# Patient Record
Sex: FEMALE | Race: WHITE | NOT HISPANIC OR LATINO | URBAN - METROPOLITAN AREA
[De-identification: names, ages, dates, MRNs, and addresses within clinical notes are randomized per-mention and may not be internally consistent; named-entity substitution may affect disease eponyms.]

---

## 2017-06-15 ENCOUNTER — COMMUNICATION - HEALTHEAST (OUTPATIENT)
Dept: FAMILY MEDICINE | Facility: CLINIC | Age: 52
End: 2017-06-15

## 2017-06-15 DIAGNOSIS — Z91.09 OTHER ALLERGY, OTHER THAN TO MEDICINAL AGENTS: ICD-10-CM

## 2017-08-03 ENCOUNTER — COMMUNICATION - HEALTHEAST (OUTPATIENT)
Dept: FAMILY MEDICINE | Facility: CLINIC | Age: 52
End: 2017-08-03

## 2017-08-03 DIAGNOSIS — Z91.09 OTHER ALLERGY, OTHER THAN TO MEDICINAL AGENTS: ICD-10-CM

## 2017-08-16 ENCOUNTER — COMMUNICATION - HEALTHEAST (OUTPATIENT)
Dept: FAMILY MEDICINE | Facility: CLINIC | Age: 52
End: 2017-08-16

## 2017-08-16 DIAGNOSIS — Z91.09 OTHER ALLERGY, OTHER THAN TO MEDICINAL AGENTS: ICD-10-CM

## 2017-09-23 ENCOUNTER — COMMUNICATION - HEALTHEAST (OUTPATIENT)
Dept: FAMILY MEDICINE | Facility: CLINIC | Age: 52
End: 2017-09-23

## 2017-09-23 DIAGNOSIS — Z91.09 OTHER ALLERGY, OTHER THAN TO MEDICINAL AGENTS: ICD-10-CM

## 2017-10-19 ENCOUNTER — COMMUNICATION - HEALTHEAST (OUTPATIENT)
Dept: FAMILY MEDICINE | Facility: CLINIC | Age: 52
End: 2017-10-19

## 2017-10-19 DIAGNOSIS — F41.1 GENERALIZED ANXIETY DISORDER: ICD-10-CM

## 2017-11-04 ENCOUNTER — COMMUNICATION - HEALTHEAST (OUTPATIENT)
Dept: FAMILY MEDICINE | Facility: CLINIC | Age: 52
End: 2017-11-04

## 2017-11-24 ENCOUNTER — OFFICE VISIT - HEALTHEAST (OUTPATIENT)
Dept: FAMILY MEDICINE | Facility: CLINIC | Age: 52
End: 2017-11-24

## 2017-11-24 DIAGNOSIS — J01.00 ACUTE MAXILLARY SINUSITIS, RECURRENCE NOT SPECIFIED: ICD-10-CM

## 2017-11-24 DIAGNOSIS — Z12.11 SCREEN FOR COLON CANCER: ICD-10-CM

## 2017-11-24 DIAGNOSIS — Z12.31 VISIT FOR SCREENING MAMMOGRAM: ICD-10-CM

## 2017-11-24 DIAGNOSIS — Z23 NEED FOR VACCINATION: ICD-10-CM

## 2017-11-24 DIAGNOSIS — Z00.00 HEALTH MAINTENANCE EXAMINATION: ICD-10-CM

## 2017-11-24 ASSESSMENT — MIFFLIN-ST. JEOR: SCORE: 1316.42

## 2017-11-28 ENCOUNTER — COMMUNICATION - HEALTHEAST (OUTPATIENT)
Dept: FAMILY MEDICINE | Facility: CLINIC | Age: 52
End: 2017-11-28

## 2017-12-11 ENCOUNTER — COMMUNICATION - HEALTHEAST (OUTPATIENT)
Dept: SCHEDULING | Facility: CLINIC | Age: 52
End: 2017-12-11

## 2017-12-19 ENCOUNTER — COMMUNICATION - HEALTHEAST (OUTPATIENT)
Dept: FAMILY MEDICINE | Facility: CLINIC | Age: 52
End: 2017-12-19

## 2017-12-19 ENCOUNTER — OFFICE VISIT - HEALTHEAST (OUTPATIENT)
Dept: FAMILY MEDICINE | Facility: CLINIC | Age: 52
End: 2017-12-19

## 2017-12-19 DIAGNOSIS — Z00.00 HEALTH MAINTENANCE EXAMINATION: ICD-10-CM

## 2017-12-19 DIAGNOSIS — R05.9 COUGH: ICD-10-CM

## 2017-12-19 LAB
CHOLEST SERPL-MCNC: 249 MG/DL
FASTING STATUS PATIENT QL REPORTED: YES
HDLC SERPL-MCNC: 46 MG/DL
LDLC SERPL CALC-MCNC: 175 MG/DL
TRIGL SERPL-MCNC: 140 MG/DL

## 2018-01-20 ENCOUNTER — COMMUNICATION - HEALTHEAST (OUTPATIENT)
Dept: FAMILY MEDICINE | Facility: CLINIC | Age: 53
End: 2018-01-20

## 2018-01-20 DIAGNOSIS — F41.1 GENERALIZED ANXIETY DISORDER: ICD-10-CM

## 2018-10-02 ENCOUNTER — COMMUNICATION - HEALTHEAST (OUTPATIENT)
Dept: FAMILY MEDICINE | Facility: CLINIC | Age: 53
End: 2018-10-02

## 2018-10-02 DIAGNOSIS — J01.00 ACUTE MAXILLARY SINUSITIS, RECURRENCE NOT SPECIFIED: ICD-10-CM

## 2018-10-03 ENCOUNTER — COMMUNICATION - HEALTHEAST (OUTPATIENT)
Dept: FAMILY MEDICINE | Facility: CLINIC | Age: 53
End: 2018-10-03

## 2018-10-05 ENCOUNTER — OFFICE VISIT - HEALTHEAST (OUTPATIENT)
Dept: FAMILY MEDICINE | Facility: CLINIC | Age: 53
End: 2018-10-05

## 2018-10-05 DIAGNOSIS — J20.9 ACUTE BRONCHITIS WITH SYMPTOMS > 10 DAYS: ICD-10-CM

## 2018-10-09 ENCOUNTER — OFFICE VISIT - HEALTHEAST (OUTPATIENT)
Dept: FAMILY MEDICINE | Facility: CLINIC | Age: 53
End: 2018-10-09

## 2018-10-09 ENCOUNTER — RECORDS - HEALTHEAST (OUTPATIENT)
Dept: ADMINISTRATIVE | Facility: OTHER | Age: 53
End: 2018-10-09

## 2018-10-09 DIAGNOSIS — Z00.00 HEALTH MAINTENANCE EXAMINATION: ICD-10-CM

## 2018-10-09 DIAGNOSIS — E78.2 MIXED HYPERLIPIDEMIA: ICD-10-CM

## 2018-10-09 DIAGNOSIS — J45.20 MILD INTERMITTENT ASTHMA, UNSPECIFIED WHETHER COMPLICATED: ICD-10-CM

## 2018-10-09 DIAGNOSIS — F41.1 GENERALIZED ANXIETY DISORDER: ICD-10-CM

## 2018-10-09 LAB
CHOLEST SERPL-MCNC: 248 MG/DL
FASTING STATUS PATIENT QL REPORTED: YES
HDLC SERPL-MCNC: 47 MG/DL
LDLC SERPL CALC-MCNC: 178 MG/DL
TRIGL SERPL-MCNC: 115 MG/DL

## 2018-10-09 ASSESSMENT — MIFFLIN-ST. JEOR: SCORE: 1307.92

## 2018-11-01 ENCOUNTER — RECORDS - HEALTHEAST (OUTPATIENT)
Dept: ADMINISTRATIVE | Facility: OTHER | Age: 53
End: 2018-11-01

## 2018-11-18 ENCOUNTER — COMMUNICATION - HEALTHEAST (OUTPATIENT)
Dept: FAMILY MEDICINE | Facility: CLINIC | Age: 53
End: 2018-11-18

## 2018-11-18 DIAGNOSIS — F41.1 GENERALIZED ANXIETY DISORDER: ICD-10-CM

## 2018-11-24 ENCOUNTER — COMMUNICATION - HEALTHEAST (OUTPATIENT)
Dept: FAMILY MEDICINE | Facility: CLINIC | Age: 53
End: 2018-11-24

## 2018-11-24 DIAGNOSIS — J01.00 ACUTE MAXILLARY SINUSITIS, RECURRENCE NOT SPECIFIED: ICD-10-CM

## 2019-04-05 ENCOUNTER — COMMUNICATION - HEALTHEAST (OUTPATIENT)
Dept: FAMILY MEDICINE | Facility: CLINIC | Age: 54
End: 2019-04-05

## 2019-04-05 DIAGNOSIS — J01.00 ACUTE MAXILLARY SINUSITIS, RECURRENCE NOT SPECIFIED: ICD-10-CM

## 2019-04-13 ENCOUNTER — COMMUNICATION - HEALTHEAST (OUTPATIENT)
Dept: FAMILY MEDICINE | Facility: CLINIC | Age: 54
End: 2019-04-13

## 2019-04-13 DIAGNOSIS — J01.00 ACUTE MAXILLARY SINUSITIS, RECURRENCE NOT SPECIFIED: ICD-10-CM

## 2019-06-18 ENCOUNTER — COMMUNICATION - HEALTHEAST (OUTPATIENT)
Dept: FAMILY MEDICINE | Facility: CLINIC | Age: 54
End: 2019-06-18

## 2019-06-18 DIAGNOSIS — J01.00 ACUTE MAXILLARY SINUSITIS, RECURRENCE NOT SPECIFIED: ICD-10-CM

## 2019-07-29 ENCOUNTER — COMMUNICATION - HEALTHEAST (OUTPATIENT)
Dept: FAMILY MEDICINE | Facility: CLINIC | Age: 54
End: 2019-07-29

## 2019-07-29 DIAGNOSIS — J01.00 ACUTE MAXILLARY SINUSITIS, RECURRENCE NOT SPECIFIED: ICD-10-CM

## 2019-09-20 ENCOUNTER — COMMUNICATION - HEALTHEAST (OUTPATIENT)
Dept: FAMILY MEDICINE | Facility: CLINIC | Age: 54
End: 2019-09-20

## 2019-09-20 DIAGNOSIS — J01.00 ACUTE MAXILLARY SINUSITIS, RECURRENCE NOT SPECIFIED: ICD-10-CM

## 2019-10-24 ENCOUNTER — COMMUNICATION - HEALTHEAST (OUTPATIENT)
Dept: FAMILY MEDICINE | Facility: CLINIC | Age: 54
End: 2019-10-24

## 2019-10-24 DIAGNOSIS — J01.00 ACUTE MAXILLARY SINUSITIS, RECURRENCE NOT SPECIFIED: ICD-10-CM

## 2019-11-24 ENCOUNTER — COMMUNICATION - HEALTHEAST (OUTPATIENT)
Dept: FAMILY MEDICINE | Facility: CLINIC | Age: 54
End: 2019-11-24

## 2019-11-24 DIAGNOSIS — F41.1 GENERALIZED ANXIETY DISORDER: ICD-10-CM

## 2019-12-18 ENCOUNTER — COMMUNICATION - HEALTHEAST (OUTPATIENT)
Dept: FAMILY MEDICINE | Facility: CLINIC | Age: 54
End: 2019-12-18

## 2019-12-18 DIAGNOSIS — J01.00 ACUTE MAXILLARY SINUSITIS, RECURRENCE NOT SPECIFIED: ICD-10-CM

## 2019-12-26 ENCOUNTER — OFFICE VISIT - HEALTHEAST (OUTPATIENT)
Dept: FAMILY MEDICINE | Facility: CLINIC | Age: 54
End: 2019-12-26

## 2019-12-26 DIAGNOSIS — Z12.31 VISIT FOR SCREENING MAMMOGRAM: ICD-10-CM

## 2019-12-26 DIAGNOSIS — Z01.419 WELL FEMALE EXAM WITH ROUTINE GYNECOLOGICAL EXAM: ICD-10-CM

## 2019-12-26 DIAGNOSIS — N89.8 VAGINAL DISCHARGE: ICD-10-CM

## 2019-12-26 DIAGNOSIS — R30.0 BURNING WITH URINATION: ICD-10-CM

## 2019-12-26 LAB
ALBUMIN UR-MCNC: NEGATIVE MG/DL
APPEARANCE UR: CLEAR
BACTERIA #/AREA URNS HPF: ABNORMAL HPF
BILIRUB UR QL STRIP: NEGATIVE
CLUE CELLS: NORMAL
COLOR UR AUTO: YELLOW
GLUCOSE UR STRIP-MCNC: NEGATIVE MG/DL
HGB UR QL STRIP: ABNORMAL
KETONES UR STRIP-MCNC: NEGATIVE MG/DL
LEUKOCYTE ESTERASE UR QL STRIP: NEGATIVE
MUCOUS THREADS #/AREA URNS LPF: ABNORMAL LPF
NITRATE UR QL: NEGATIVE
PH UR STRIP: 6.5 [PH] (ref 5–8)
RBC #/AREA URNS AUTO: ABNORMAL HPF
SP GR UR STRIP: 1.02 (ref 1–1.03)
SQUAMOUS #/AREA URNS AUTO: ABNORMAL LPF
TRICHOMONAS, WET PREP: NORMAL
UROBILINOGEN UR STRIP-ACNC: ABNORMAL
WBC #/AREA URNS AUTO: ABNORMAL HPF
YEAST, WET PREP: NORMAL

## 2019-12-26 ASSESSMENT — MIFFLIN-ST. JEOR: SCORE: 1319.38

## 2019-12-26 ASSESSMENT — PATIENT HEALTH QUESTIONNAIRE - PHQ9: SUM OF ALL RESPONSES TO PHQ QUESTIONS 1-9: 3

## 2020-02-18 ENCOUNTER — COMMUNICATION - HEALTHEAST (OUTPATIENT)
Dept: FAMILY MEDICINE | Facility: CLINIC | Age: 55
End: 2020-02-18

## 2020-02-18 DIAGNOSIS — J01.00 ACUTE MAXILLARY SINUSITIS, RECURRENCE NOT SPECIFIED: ICD-10-CM

## 2020-02-23 ENCOUNTER — COMMUNICATION - HEALTHEAST (OUTPATIENT)
Dept: FAMILY MEDICINE | Facility: CLINIC | Age: 55
End: 2020-02-23

## 2020-02-23 DIAGNOSIS — J01.00 ACUTE MAXILLARY SINUSITIS, RECURRENCE NOT SPECIFIED: ICD-10-CM

## 2020-02-25 ENCOUNTER — COMMUNICATION - HEALTHEAST (OUTPATIENT)
Dept: FAMILY MEDICINE | Facility: CLINIC | Age: 55
End: 2020-02-25

## 2020-02-25 DIAGNOSIS — J01.00 ACUTE MAXILLARY SINUSITIS, RECURRENCE NOT SPECIFIED: ICD-10-CM

## 2020-04-22 ENCOUNTER — COMMUNICATION - HEALTHEAST (OUTPATIENT)
Dept: FAMILY MEDICINE | Facility: CLINIC | Age: 55
End: 2020-04-22

## 2020-04-22 DIAGNOSIS — F41.1 GENERALIZED ANXIETY DISORDER: ICD-10-CM

## 2020-04-30 ENCOUNTER — COMMUNICATION - HEALTHEAST (OUTPATIENT)
Dept: FAMILY MEDICINE | Facility: CLINIC | Age: 55
End: 2020-04-30

## 2020-04-30 DIAGNOSIS — F41.1 GENERALIZED ANXIETY DISORDER: ICD-10-CM

## 2020-05-01 RX ORDER — SERTRALINE HYDROCHLORIDE 100 MG/1
TABLET, FILM COATED ORAL
Qty: 60 TABLET | Refills: 0 | Status: SHIPPED | OUTPATIENT
Start: 2020-05-01

## 2020-05-28 ENCOUNTER — COMMUNICATION - HEALTHEAST (OUTPATIENT)
Dept: FAMILY MEDICINE | Facility: CLINIC | Age: 55
End: 2020-05-28

## 2020-05-28 DIAGNOSIS — J01.00 ACUTE MAXILLARY SINUSITIS, RECURRENCE NOT SPECIFIED: ICD-10-CM

## 2020-05-29 RX ORDER — DM/PE/ACETAMINOPHEN/CHLORPHENR 10-5-325-2
TABLET ORAL
Qty: 30 TABLET | Refills: 0 | Status: SHIPPED | OUTPATIENT
Start: 2020-05-29

## 2021-05-26 ASSESSMENT — PATIENT HEALTH QUESTIONNAIRE - PHQ9: SUM OF ALL RESPONSES TO PHQ QUESTIONS 1-9: 3

## 2021-05-27 NOTE — TELEPHONE ENCOUNTER
RN cannot approve Refill Request    RN can NOT refill this medication med is not covered by policy/route to provider. Last office visit: 10/9/2018 Rosa Gerard FNP Last Physical: 11/24/2017 Last MTM visit: Visit date not found Last visit same specialty: 10/9/2018 Rosa Gerard FNP.  Next visit within 3 mo: Visit date not found  Next physical within 3 mo: Visit date not found      Betsy Mathews, [facility] Triage/Med Refill 4/5/2019    Requested Prescriptions   Pending Prescriptions Disp Refills     WAL-ITIN D 12 HOUR 5-120 mg Tb12 [Pharmacy Med Name: WAL-ITIN D 12HR TABLETS 10'S] 30 tablet 0     Sig: TAKE 1 TABLET BY MOUTH DAILY    There is no refill protocol information for this order

## 2021-05-28 ENCOUNTER — RECORDS - HEALTHEAST (OUTPATIENT)
Dept: ADMINISTRATIVE | Facility: CLINIC | Age: 56
End: 2021-05-28

## 2021-05-28 ASSESSMENT — ASTHMA QUESTIONNAIRES: ACT_TOTALSCORE: 24

## 2021-05-29 NOTE — TELEPHONE ENCOUNTER
Controlled Substance Refill Request  Medication:   Requested Prescriptions     Pending Prescriptions Disp Refills     WAL-ITIN D 12 HOUR 5-120 mg Tb12 [Pharmacy Med Name: WAL-ITIN D 12HR TABLETS 10'S] 30 tablet 0     Sig: TAKE 1 TABLET BY MOUTH DAILY     Date Last Fill: 4/9/19  Ph[facility]   Submit electronically to pharmacy    Controlled Substance Agreement on File:   Encounter-Level CSA Scan Date:    There are no encounter-level csa scan date.       Last office visit with primary: 10/9/2018

## 2021-05-30 ENCOUNTER — RECORDS - HEALTHEAST (OUTPATIENT)
Dept: ADMINISTRATIVE | Facility: CLINIC | Age: 56
End: 2021-05-30

## 2021-05-30 NOTE — TELEPHONE ENCOUNTER
Controlled Substance Refill Request  Medication:   Requested Prescriptions     Pending Prescriptions Disp Refills     WAL-ITIN D 12 HOUR 5-120 mg Tb12 [Pharmacy Med Name: WAL-ITIN D 12HR TABLETS 10'S] 30 tablet 0     Sig: TAKE 1 TABLET BY MOUTH DAILY     Date Last Fill: 6/19/19  Pharmacy: [id number]   Submit electronically to pharmacy  Controlled Substance Agreement on File:   Encounter-Level CSA Scan Date:    There are no encounter-level csa scan date.       Last office visit: Last office visit pertaining to requested medication was 10/9/18.

## 2021-05-31 VITALS — BODY MASS INDEX: 25.48 KG/M2 | WEIGHT: 155.5 LBS

## 2021-05-31 VITALS — HEIGHT: 66 IN | WEIGHT: 156 LBS | BODY MASS INDEX: 25.07 KG/M2

## 2021-06-01 ENCOUNTER — RECORDS - HEALTHEAST (OUTPATIENT)
Dept: ADMINISTRATIVE | Facility: CLINIC | Age: 56
End: 2021-06-01

## 2021-06-01 NOTE — TELEPHONE ENCOUNTER
Controlled Substance Refill Request  Medication:   Requested Prescriptions     Pending Prescriptions Disp Refills     WAL-ITIN D 12 HOUR 5-120 mg Tb12 [Pharmacy Med Name: WAL-ITIN D 12HR TABLETS 10'S] 30 tablet 0     Sig: TAKE 1 TABLET BY MOUTH DAILY     Date Last Fill: 7/30/19  Pharmacy: [facility]   Submit electronically to pharmacy  Controlled Substance Agreement on File:   Encounter-Level CSA Scan Date:    There are no encounter-level csa scan date.       Last office visit: Last office visit pertaining to requested medication was 10/9/18.

## 2021-06-02 VITALS — BODY MASS INDEX: 26.06 KG/M2 | WEIGHT: 159 LBS

## 2021-06-02 VITALS — WEIGHT: 154.13 LBS | HEIGHT: 66 IN | BODY MASS INDEX: 24.77 KG/M2

## 2021-06-02 NOTE — TELEPHONE ENCOUNTER
RN cannot approve Refill Request    RN can NOT refill this medication med is not covered by policy/route to provider. Last office visit: 10/9/2018 Rosa Gerard FNP Last Physical: 11/24/2017 Last MTM visit: Visit date not found Last visit same specialty: 10/9/2018 Rosa Gerard FNP.  Next visit within 3 mo: Visit date not found  Next physical within 3 mo: Visit date not found      Nicole Patel, [facility] Triage/Med Refill 10/24/2019    Requested Prescriptions   Pending Prescriptions Disp Refills     WAL-ITIN D 12 HOUR 5-120 mg Tb12 [Pharmacy Med Name: WAL-ITIN D 12HR TABLETS 10'S] 30 tablet 0     Sig: TAKE 1 TABLET BY MOUTH DAILY       There is no refill protocol information for this order

## 2021-06-03 NOTE — TELEPHONE ENCOUNTER
I spoke with Manuela and she will call back to schedule an establish care appointment with a physical

## 2021-06-03 NOTE — TELEPHONE ENCOUNTER
RN cannot approve Refill Request    RN can NOT refill this medication PCP messaged that patient is overdue for Office Visit. Last office visit: 10/9/2018 Rosa Gerard FNP Last Physical: 11/24/2017 Last MTM visit: Visit date not found Last visit same specialty: 10/9/2018 Rosa Gerard FNP.  Next visit within 3 mo: Visit date not found  Next physical within 3 mo: Visit date not found      Nicole Patel, [facility] Connection Triage/Med Refill 11/24/2019    Requested Prescriptions   Pending Prescriptions Disp Refills     sertraline (ZOLOFT) 100 MG tablet [Pharmacy Med Name: SERTRALINE 100MG TABLETS] 180 tablet 0     Sig: TAKE 2 TABLETS(200 MG) BY MOUTH DAILY       SSRI Refill Protocol  Failed - 11/24/2019  9:44 PM        Failed - PCP or prescribing provider visit in last year     Last office visit with prescriber/PCP: 10/9/2018 Rosa Gerard FNP OR same dept: Visit date not found OR same specialty: 10/9/2018 Rosa Gerard FNP  Last physical: 11/24/2017 Last MTM visit: Visit date not found   Next visit within 3 mo: Visit date not found  Next physical within 3 mo: Visit date not found  Prescriber OR PCP: ERNESTINA Bridges  Last diagnosis associated with med order: 1. Generalized anxiety disorder  - sertraline (ZOLOFT) 100 MG tablet [Pharmacy Med Name: SERTRALINE 100MG TABLETS]; TAKE 2 TABLETS(200 MG) BY MOUTH DAILY  Dispense: 180 tablet; Refill: 0    If protocol passes may refill for 12 months if within 3 months of last provider visit (or a total of 15 months).

## 2021-06-04 VITALS
BODY MASS INDEX: 26.39 KG/M2 | DIASTOLIC BLOOD PRESSURE: 60 MMHG | RESPIRATION RATE: 12 BRPM | TEMPERATURE: 97.9 F | HEIGHT: 65 IN | SYSTOLIC BLOOD PRESSURE: 96 MMHG | WEIGHT: 158.4 LBS | HEART RATE: 68 BPM
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
## 2021-06-04 NOTE — TELEPHONE ENCOUNTER
Former patient of Gerard & has not established care with another provider.  Please assign refill request to covering provider per Clinic standard process.    Controlled Substance Refill Request  Medication:   Requested Prescriptions     Pending Prescriptions Disp Refills     WAL-ITIN D 12 HOUR 5-120 mg Tb12 [Pharmacy Med Name: WAL-ITIN D 12HR TABLETS 10'S] 30 tablet 0     Sig: TAKE 1 TABLET BY MOUTH DAILY     Date Last Fill: 10/25/19  Pharmacy: [facility]   Submit electronically to pharmacy  Controlled Substance Agreement on File:   Encounter-Level CSA Scan Date:    There are no encounter-level csa scan date.       Last office visit: Last office visit pertaining to requested medication was 10/9/18.

## 2021-06-06 NOTE — TELEPHONE ENCOUNTER
Medication: wal-itin d    Last Date Filled 12/20/2019     pulled: NO  ( Insert snip-it from last 2 months of )     Only PCP Prescribing?: NO    Taken as prescribed from physician notes?: unsure  ( Insert text from last clinic note assessing medication)     CSA in last year: NO    Random Utox in last year: NO    Opioids + benzodiazepines? NO

## 2021-06-06 NOTE — TELEPHONE ENCOUNTER
Former patient of Gerard & has not established care with another provider.  Please assign refill request to covering provider per Clinic standard process.      Controlled Substance Refill Request  Medication Name:   Requested Prescriptions     Pending Prescriptions Disp Refills     loratadine-pseudoephedrine (WAL-WILLIAMIN D 12 HOUR) 5-120 mg Tb12 30 tablet 0     Sig: Take 1 tablet by mouth daily.     Date Last Fill: 12/20/19  Requested [facility]  Submit electronically to pharmacy  Controlled Substance Agreement on file:   Encounter-Level CSA Scan Date:    There are no encounter-level csa scan date.        Last office visit:  12/26/19

## 2021-06-06 NOTE — TELEPHONE ENCOUNTER
RN cannot approve Refill Request    RN can NOT refill this medication med is not covered by policy/route to provider. Last office visit: 10/9/2018 Rosa Gerard FNP Last Physical: 11/24/2017 Last MTM visit: Visit date not found Last visit same specialty: 10/9/2018 oRsa Gerard FNP.  Next visit within 3 mo: Visit date not found  Next physical within 3 mo: Visit date not found      Marielena Villatoro, [facility] Triage/Med Refill 2/19/2020    Requested Prescriptions   Pending Prescriptions Disp Refills     WAL-ITIN D 12 HOUR 5-120 mg Tb12 [Pharmacy Med Name: WAL-ITIN D 12HR TABLETS 30'S] 30 tablet 0     Sig: TAKE 1 TABLET BY MOUTH DAILY       There is no refill protocol information for this order

## 2021-06-06 NOTE — TELEPHONE ENCOUNTER
Patient has not been seen at this clinic since 10/2018.  She has recently been seen by a provider [facility].  Please route the referral to the provider most recently seen.    Kwame Baumann MD  2/25/2020

## 2021-06-06 NOTE — TELEPHONE ENCOUNTER
Please see previous message regarding same request.  Not seen at this clinic since 10/2018.  Recently seen at [facility].  Please route the message to the provider

## 2021-06-06 NOTE — TELEPHONE ENCOUNTER
RN cannot approve Refill Request    RN can NOT refill this medication med is not covered by policy/route to provider. Last office visit: Visit date not found Last Physical: Visit date not found Last MTM visit: Visit date not found Last visit same specialty: 10/9/2018 Rosa Gerard, [facility].  Next visit within 3 mo: Visit date not found  Next physical within 3 mo: Visit date not found      Marielena Villatoro, [facility] Triage/Med Refill 2/19/2020    Requested Prescriptions   Pending Prescriptions Disp Refills     WAL-ITIN D 12 HOUR 5-120 mg Tb12 [Pharmacy Med Name: WAL-ITIN D 12HR TABLETS 10'S] 30 tablet 0     Sig: TAKE 1 TABLET BY MOUTH DAILY       There is no refill protocol information for this order

## 2021-06-07 NOTE — TELEPHONE ENCOUNTER
RN cannot approve Refill Request    RN can NOT refill this medication PCP messaged that patient is overdue for Office Visit. Last office visit: Visit date not found Last Physical: Visit date not found Last MTM visit: Visit date not found Last visit same specialty: 10/9/2018 Rosa Gerard FNP.  Next visit within 3 mo: Visit date not found  Next physical within 3 mo: Visit date not found      Sanjuanita Morales, [facility] Connection Triage/Med Refill 4/23/2020    Requested Prescriptions   Pending Prescriptions Disp Refills     sertraline (ZOLOFT) 100 MG tablet [Pharmacy Med Name: SERTRALINE 100MG TABLETS] 180 tablet 0     Sig: TAKE 2 TABLETS(200 MG) BY MOUTH DAILY       SSRI Refill Protocol  Failed - 4/22/2020 12:50 PM        Failed - PCP or prescribing provider visit in last year     Last office visit with prescriber/PCP: Visit date not found OR same dept: Visit date not found OR same specialty: 10/9/2018 Rosa Gerard FNP  Last physical: Visit date not found Last MTM visit: Visit date not found   Next visit within 3 mo: Visit date not found  Next physical within 3 mo: Visit date not found  Prescriber OR PCP: Perri Steele MD  Last diagnosis associated with med order: 1. Generalized anxiety disorder  - sertraline (ZOLOFT) 100 MG tablet [Pharmacy Med Name: SERTRALINE 100MG TABLETS]; TAKE 2 TABLETS(200 MG) BY MOUTH DAILY  Dispense: 180 tablet; Refill: 0    If protocol passes may refill for 12 months if within 3 months of last provider visit (or a total of 15 months).

## 2021-06-07 NOTE — TELEPHONE ENCOUNTER
Former patient of Gerard & has not established care with another provider.  Please assign refill request to covering provider per Clinic standard process.      RN cannot approve Refill Request    RN can NOT refill this medication Protocol failed and NO refill given.    Louisa Broderick, [facility] Triage/Med Refill 5/1/2020    Requested Prescriptions   Pending Prescriptions Disp Refills     sertraline (ZOLOFT) 100 MG tablet [Pharmacy Med Name: SERTRALINE 100MG TABLETS] 60 tablet 0     Sig: TAKE 2 TABLETS(200 MG) BY MOUTH DAILY       SSRI Refill Protocol  Failed - 4/30/2020  5:22 PM        Failed - PCP or prescribing provider visit in last year     Last office visit with prescriber/PCP: Visit date not found OR same dept: Visit date not found OR same specialty: 10/9/2018 Rosa Gerard, ERNESTINA  Last physical: Visit date not found Last MTM visit: Visit date not found   Next visit within 3 mo: Visit date not found  Next physical within 3 mo: Visit date not found  Prescriber OR PCP: Rahel Tom PA-C  Last diagnosis associated with med order: 1. Generalized anxiety disorder  - sertraline (ZOLOFT) 100 MG tablet [Pharmacy Med Name: SERTRALINE 100MG TABLETS]; TAKE 2 TABLETS(200 MG) BY MOUTH DAILY  Dispense: 60 tablet; Refill: 0    If protocol passes may refill for 12 months if within 3 months of last provider visit (or a total of 15 months).

## 2021-06-08 NOTE — TELEPHONE ENCOUNTER
Controlled Substance Refill Request  Medication Name:   Requested Prescriptions     Pending Prescriptions Disp Refills     WAL-ITIN D 12 HOUR 5-120 mg Tb12 [Pharmacy Med Name: WAL-ITIN D 12HR TABLETS 10'S] 30 tablet 0     Sig: TAKE 1 TABLET BY MOUTH EVERY DAY WHEN SYMPTOMS OF CONGESTION ARE PRESENT     Date Last Fill: 2/25/20  Requested [facility]  Submit electronically to pharmacy  Controlled Substance Agreement on file:   Encounter-Level CSA Scan Date:    There are no encounter-level csa scan date.        Last office visit:  12/26/19
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
## 2021-06-14 NOTE — PROGRESS NOTES
ASSESSMENT:  1. Cough     2. Health maintenance examination  Lipid [place] FASTING    Basic Metabolic Panel    HM2(CBC w/o Differential)    Vitamin D, Total (25-Hydroxy)    Thyroid Cascade       PLAN:  Monitor cough symptoms and for development of fever.  Return if worsening shortness of breath or fever develop.    Use Mucinex 12 hour twice daily and loosen mucus. Increase water intake.    Saline spray in both nostrils for moisture and to thin mucus.    Use a humidifier at home to moisten the air.    Collect fasting lab work today.    No problem-specific Assessment & Plan notes found for this encounter.      There are no Patient Instructions on file for this visit.    No orders of the defined types were placed in this encounter.    There are no discontinued medications.    No Follow-up on file.    CHIEF COMPLAINT:  Chief Complaint   Patient presents with     Follow-up     Patient dx with pneumonia 3 weeks ago - also fasting for blood work.       HISTORY OF PRESENT ILLNESS:  Manuela is a 52 y.o. female here today for follow-up on sinusitis from 3 weeks ago.  She is also back fasting to collect her lab work today.  Overall she is feeling better since antibiotics.  She still has an ongoing cough, but otherwise symptoms have improved.  She had a fever about a week and a half ago but none since.  Temp max at that time was around 99.5.  Cough is dry, she does not feel shortness of breath and she has not been wheezing.  She does have an inhaler on hand for as needed use.  She has not needed to use this more than typical.  She is using Claritin-D to help control symptoms.    REVIEW OF SYSTEMS:      Pertinent items are noted in HPI.  All other systems are negative  PFSH:  Reviewed, no changes      TOBACCO USE:  History   Smoking Status     Never Smoker   Smokeless Tobacco     Not on file       VITALS:  Vitals:    12/19/17 0852   BP: 116/70   Patient Site: Left Arm   Patient Position: Sitting   Cuff Size: Adult Regular   Pulse:  68   Resp: 14   Temp: 98.1  F (36.7  C)   TempSrc: Oral   Weight: 155 lb 8 oz (70.5 kg)     Wt Readings from Last 3 Encounters:   12/19/17 155 lb 8 oz (70.5 kg)   11/24/17 156 lb (70.8 kg)   10/19/16 160 lb 8 oz (72.8 kg)       PHYSICAL EXAM:   /70 (Patient Site: Left Arm, Patient Position: Sitting, Cuff Size: Adult Regular)  Pulse 68  Temp 98.1  F (36.7  C) (Oral)   Resp 14  Wt 155 lb 8 oz (70.5 kg)  BMI 25.48 kg/m2  General appearance: alert, appears stated age and cooperative  Eyes: conjunctivae/corneas clear. PERRL, EOM's intact. Fundi benign.  Ears: normal TM's and external ear canals both ears  Nose: Nares normal. Septum midline. Mucosa normal. No drainage or sinus tenderness.  Throat: lips, mucosa, and tongue normal; teeth and gums normal  Neck: no adenopathy, no carotid bruit, no JVD, supple, symmetrical, trachea midline and thyroid not enlarged, symmetric, no tenderness/mass/nodules  Lungs: clear to auscultation bilaterally  Heart: regular rate and rhythm, S1, S2 normal, no murmur, click, rub or gallop    DATA REVIEWED:  Additional History from Old Records Summarized (2): 0  Decision to Obtain Records (1): 0  Radiology Tests Summarized or Ordered (1): 0  Labs Reviewed or Ordered (1): 0  Medicine Test Summarized or Ordered (1): 0  Independent Review of EKG or X-RAY(2 each): 0    The visit lasted a total of 20 minutes face to face with the patient. Over 50% of the time was spent counseling and educating the patient about plan of care.    MEDICATIONS:  Current Outpatient Prescriptions   Medication Sig Dispense Refill     albuterol (PROAIR HFA) 90 mcg/actuation inhaler Inhale 2 puffs every 4 (four) hours as needed.       ALLERGY RELIEF D-24  mg per 24 hr tablet TAKE ONE TABLET BY MOUTH ONE TIME DAILY 30 tablet 0     ascorbic acid (ASCORBIC ACID WITH EFREM HIPS) 500 MG tablet Take 500 mg by mouth daily.       CALCIUM ORAL Take 1 tablet by mouth daily.       CHOLECALCIFEROL, VITAMIN D3, (VITAMIN  D3 ORAL) Take 1 tablet by mouth daily.       ferrous sulfate 65 mg elemental iron (IRON) Take 1 tablet by mouth daily. With food.       fluticasone (FLONASE) 50 mcg/actuation nasal spray Use two sprays in each nostril daily 16 g 11     LORATADINE-D  mg per 24 hr tablet TAKE ONE TABLET BY MOUTH ONE TIME DAILY 30 tablet 3     loratadine-pseudoephedrine (CLARITIN D 12-HR) 5-120 mg Tb12 Take 1 tablet by mouth daily. 30 tablet 5     MULTIVITAMIN ORAL Take 1 tablet by mouth daily. TABS.       omega-3 fatty acids-vitamin E (FISH OIL) 1,000 mg cap Take 1 capsule by mouth daily.       sertraline (ZOLOFT) 100 MG tablet TAKE TWO TABLETS BY MOUTH DAILY 180 tablet 0     No current facility-administered medications for this visit.        This note has been dictated using voice recognition software. Any grammatical or context distortions are unintentional and inherent to the software

## 2021-06-20 NOTE — PROGRESS NOTES
ASSESSMENT:  1. Health maintenance examination  Influenza, Seasonal,Quad Inj, 36+ MOS    Lipid [place] FASTING    Cologuard   2. Mild intermittent asthma, unspecified whether complicated     3. Generalized anxiety disorder     4. Mixed hyperlipidemia         PLAN:  Med check and labs done today.  Given flu shot by nursing.  Patient is stable on current medication regimen.  Asthma is doing well with occasional use of inhaler.  Patient recently diagnosed with pneumonia but doing better on her antibiotics.  Sertraline working well for anxiety depression.  Recheck lipids today since patient is fasting.  Because Cologuard versus colonoscopy screening and she would like to proceed with Cologuard. Discussed mammogram screening and patient declines at this time.  No problem-specific Assessment & Plan notes found for this encounter.    The following are part of a depression follow up plan for the patient:  taking history of mental health management  There are no Patient Instructions on file for this visit.    Orders Placed This Encounter   Procedures     Influenza, Seasonal,Quad Inj, 36+ MOS     Lipid [place] FASTING     Order Specific Question:   Fasting is required?     Answer:   Yes     Cologuard     Medications Discontinued During This Encounter   Medication Reason     WAL-ITIN D 12 HOUR 5-120 mg Tb12 Therapy completed       No Follow-up on file.    CHIEF COMPLAINT:  Chief Complaint   Patient presents with     Medication Management       HISTORY OF PRESENT ILLNESS:  Manuela is a 52 y.o. female here today for medication check.  Patient was unsure if she should fast so is fasting for labs.  She is due to update her asthma action plan.  Patient was recently diagnosed with pneumonia and is in the middle course of doxycycline.  Outside of this her asthma has been under optimal control.  She rarely uses her pro-air inhaler.  She does use allergy medication on a consistent basis.  Patient also with history of hyperlipidemia, due  "for recheck of lipids today we will check these since she is fasting.  Patient with history of traumatic brain injury, anxiety depression.  Currently on sertraline and this is working well.  She would like to continue this.  Allergies currently under good control with Claritin-D as needed.  Patient continues to see therapist at [facility] for brain injury.  Doing well.    REVIEW OF SYSTEMS:      Pertinent items are noted in HPI.  All other systems are negative  PFSH:  Reviewed, no changes      TOBACCO USE:  History   Smoking Status     Never Smoker   Smokeless Tobacco     Never Used       VITALS:  Vitals:    10/09/18 0812   BP: 111/64   Patient Site: Left Arm   Patient Position: Sitting   Cuff Size: Adult Regular   Pulse: 68   Resp: 14   Weight: 154 lb 2 oz (69.9 kg)   Height: 5' 5.5\" (1.664 m)     Wt Readings from Last 3 Encounters:   10/09/18 154 lb 2 oz (69.9 kg)   10/05/18 159 lb (72.1 kg)   12/19/17 155 lb 8 oz (70.5 kg)       PHYSICAL EXAM:   /64 (Patient Site: Left Arm, Patient Position: Sitting, Cuff Size: Adult Regular)  Pulse 68  Resp 14  Ht 5' 5.5\" (1.664 m)  Wt 154 lb 2 oz (69.9 kg)  BMI 25.26 kg/m2  General appearance: alert, appears stated age and cooperative  Eyes: conjunctivae/corneas clear. PERRL, EOM's intact. Fundi benign.  Ears: normal TM's and external ear canals both ears  Nose: Nares normal. Septum midline. Mucosa normal. No drainage or sinus tenderness.  Throat: lips, mucosa, and tongue normal; teeth and gums normal  Neck: no adenopathy, no carotid bruit, no JVD, supple, symmetrical, trachea midline and thyroid not enlarged, symmetric, no tenderness/mass/nodules  Lungs: clear to auscultation bilaterally  Heart: regular rate and rhythm, S1, S2 normal, no murmur, click, rub or gallop  Neurologic: Grossly normal  Psychologic: Mood and affect normal.      DATA REVIEWED:  Additional History from Old Records Summarized (2): [facility] note  Decision to Obtain Records (1): 0  Radiology Tests " Summarized or Ordered (1): 0  Labs Reviewed or Ordered (1): 1  Medicine Test Summarized or Ordered (1): 0  Independent Review of EKG or X-RAY(2 each): 0    The visit lasted a total of 25 minutes face to face with the patient. Over 50% of the time was spent counseling and educating the patient about plan of care.    MEDICATIONS:  Current Outpatient Prescriptions   Medication Sig Dispense Refill     albuterol (PROAIR HFA) 90 mcg/actuation inhaler Inhale 2 puffs every 4 (four) hours as needed.       ascorbic acid (ASCORBIC ACID WITH EFREM HIPS) 500 MG tablet Take 500 mg by mouth daily.       CALCIUM ORAL Take 1 tablet by mouth daily.       CHOLECALCIFEROL, VITAMIN D3, (VITAMIN D3 ORAL) Take 1 tablet by mouth daily.       doxycycline (MONODOX) 100 MG capsule Take 1 capsule (100 mg total) by mouth 2 (two) times a day for 10 days. 20 capsule 0     ferrous sulfate 65 mg elemental iron (IRON) Take 1 tablet by mouth daily. With food.       fluticasone (FLONASE) 50 mcg/actuation nasal spray Use two sprays in each nostril daily 16 g 11     inhalational spacing device Spcr Use with MDI. Pharmacist advised use 1 each 0     LORATADINE-D  mg per 24 hr tablet TAKE ONE TABLET BY MOUTH ONE TIME DAILY 30 tablet 3     MULTIVITAMIN ORAL Take 1 tablet by mouth daily. TABS.       omega-3 fatty acids-vitamin E (FISH OIL) 1,000 mg cap Take 1 capsule by mouth daily.       sertraline (ZOLOFT) 100 MG tablet TAKE TWO TABLETS BY MOUTH DAILY 180 tablet 2     ALLERGY RELIEF D-24  mg per 24 hr tablet TAKE ONE TABLET BY MOUTH ONE TIME DAILY 30 tablet 0     No current facility-administered medications for this visit.        This note has been dictated using voice recognition software. Any grammatical or context distortions are unintentional and inherent to the software
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
## 2021-07-13 ENCOUNTER — RECORDS - HEALTHEAST (OUTPATIENT)
Dept: ADMINISTRATIVE | Facility: CLINIC | Age: 56
End: 2021-07-13

## 2021-07-21 ENCOUNTER — RECORDS - HEALTHEAST (OUTPATIENT)
Dept: ADMINISTRATIVE | Facility: CLINIC | Age: 56
End: 2021-07-21

## 2021-08-22 ENCOUNTER — HEALTH MAINTENANCE LETTER (OUTPATIENT)
Age: 56
End: 2021-08-22

## 2021-10-17 ENCOUNTER — HEALTH MAINTENANCE LETTER (OUTPATIENT)
Age: 56
End: 2021-10-17

## 2022-10-02 ENCOUNTER — HEALTH MAINTENANCE LETTER (OUTPATIENT)
Age: 57
End: 2022-10-02

## 2023-10-21 ENCOUNTER — HEALTH MAINTENANCE LETTER (OUTPATIENT)
Age: 58
End: 2023-10-21